# Patient Record
Sex: MALE | Race: WHITE
[De-identification: names, ages, dates, MRNs, and addresses within clinical notes are randomized per-mention and may not be internally consistent; named-entity substitution may affect disease eponyms.]

---

## 2019-09-06 ENCOUNTER — HOSPITAL ENCOUNTER (EMERGENCY)
Dept: HOSPITAL 25 - UCCORT | Age: 54
Discharge: HOME | End: 2019-09-06
Payer: COMMERCIAL

## 2019-09-06 VITALS — SYSTOLIC BLOOD PRESSURE: 148 MMHG | DIASTOLIC BLOOD PRESSURE: 95 MMHG

## 2019-09-06 DIAGNOSIS — I10: ICD-10-CM

## 2019-09-06 DIAGNOSIS — Z87.891: ICD-10-CM

## 2019-09-06 DIAGNOSIS — J32.9: Primary | ICD-10-CM

## 2019-09-06 PROCEDURE — 99202 OFFICE O/P NEW SF 15 MIN: CPT

## 2019-09-06 PROCEDURE — 70450 CT HEAD/BRAIN W/O DYE: CPT

## 2019-09-06 PROCEDURE — G0463 HOSPITAL OUTPT CLINIC VISIT: HCPCS

## 2019-09-06 PROCEDURE — 70486 CT MAXILLOFACIAL W/O DYE: CPT

## 2019-09-06 NOTE — ED
Throat Pain/Nasal Congestion





- HPI Summary


HPI Summary: 





54 yr old male with the complaint of right frontal headache, and right frontal 

sinus pain for 1.5 weeks.  His pain is 8/10 at its worst and at it is better 

after pain meds he took earlier.  He denies fever, chills.  He denies runny 

nose.  He denies post nasal drip.  He denies focal weakness, numbness.  He 

denies change in his speech, vision, hearing, gait.  





The patient reports that he had chest tube in right chest about three weeks ago 

for a pleural effusion that he does not know the etiology of.  He quit smoking 

7 years ago.  He states he had a lot of tests and they were negative down in 

[place].  





- History of Current Complaint


Chief Complaint: UCRespiratory


Time Seen by Provider: 09/06/19 10:37





- Allergies/Home Medications


Allergies/Adverse Reactions: 


 Allergies











Allergy/AdvReac Type Severity Reaction Status Date / Time


 


codeine Allergy  Itching Verified 09/06/19 10:23











Home Medications: 


 Home Medications





Fluticasone/Umeclidin/Vilanter [Trelegy Ellipta 100-62.5-25] 1 puff DAILY 09/06/ 19 [History Confirmed 09/06/19]


Pentazocine HCl/Naloxone HCl [Pentazocine-Naloxone Tablet] 1 tab TID PRN 09/06/ 19 [History Confirmed 09/06/19]


tiZANidine TAB* [Zanaflex TAB*] 2 mg BID PRN 09/06/19 [History Confirmed 09/06/ 19]


traZODone TAB* [Desyrel TAB*] 150 mg PO BEDTIME 09/06/19 [History Confirmed 09/ 06/19]











PMH/Surg Hx/FS Hx/Imm Hx





- Surgical History


Surgery Procedure, Year, and Place: Right lung collapse/pneumothorax.  

Traumatic injuries from fall- hardware in LEFT scapula and ribs


Infectious Disease History: No


Infectious Disease History: 


   Denies: Traveled Outside the [place] in Last 30 Days





- Family History


Known Family History: Positive: None





- Social History


Occupation: Employed Full-time


Alcohol Use: Daily


Substance Use Type: Reports: None


Smoking Status (MU): Never Smoked Tobacco





Review of Systems


Constitutional: Negative


Positive: Other - right frontal sinus pain


All Other Systems Reviewed And Are Negative: Yes





Physical Exam


Triage Information Reviewed: Yes


Vital Signs On Initial Exam: 


 Initial Vitals











Temp Pulse Resp BP Pulse Ox


 


 97.9 F   72   16   148/95   100 


 


 09/06/19 10:26  09/06/19 10:26  09/06/19 10:26  09/06/19 10:26 09/06/19 10:26











Vital Signs Reviewed: Yes


Appearance: Positive: Well-Appearing, No Pain Distress


Skin: Positive: Warm, Skin Color Reflects Adequate Perfusion


Head/Face: Positive: Normal Head/Face Inspection


Eyes: Positive: EOMI, DEVON


ENT: Positive: Normal ENT inspection.  Negative: Sinus tenderness


Neck: Positive: Nontender


Respiratory/Lung Sounds: Positive: Clear to Auscultation, Breath Sounds Present


Cardiovascular: Positive: RRR.  Negative: Murmur


Abdomen Description: Positive: Nontender


Musculoskeletal: Positive: Strength/ROM Intact


Neurological: Positive: Sensory/Motor Intact, Alert, Oriented to Person Place, 

Time, CN Intact II-III, Normal Gait, Speech Normal


Psychiatric: Positive: Normal





- Maximus Coma Scale


Best Eye Response: 4 - Spontaneous


Best Motor Response: 6 - Obeys Commands


Best Verbal Response: 5 - Oriented


Coma Scale Total: 15





Diagnostics





- Vital Signs


 Vital Signs











  Temp Pulse Resp BP Pulse Ox


 


 09/06/19 10:26  97.9 F  72  16  148/95  100














- Laboratory


Lab Statement: Any lab studies that have been ordered have been reviewed, and 

results considered in the medical decision making process.





- Radiology


  ** CT BRAIN AND SINUS


Radiology Interpretation Completed By: Radiologist - sinus mucosal thickening.





EENT Course/Dx





- Course


Course Of Treatment: 54 yr old male with history of smoking and recent right 

chest pleural effusion and he states unknown etiology.  1.5 weeks of frontal 

headache.  CT brain and sinuses ordered.  CT positive for mucosal sinus 

thickening and inflammatory changes.  Will Rx with Augmentin, medrol dose chiki 

and also norco.   He states he tolerates hydrocodone well but not codeine.   

Disks of his CT are being sent with him to take to his doctor in 

[place].[phone] is the istop number





- Diagnoses


Provider Diagnoses: 


 Sinusitis, Hypertension








Discharge ED





- Sign-Out/Discharge


Documenting (check all that apply): Patient Departure


All imaging exams completed and their final reports reviewed: Yes





- Discharge Plan


Condition: Good


Disposition: HOME


Prescriptions: 


Amoxicillin/Clavulanate TAB* [Augmentin *] 875 mg PO BID #20 tab


HYDROcodone/ACETAMIN 5-325 MG* [Norco 5-325 TAB*] 1 tab PO Q6H PRN #4 tab MDD 2


 PRN Reason: Pain - Moderate


methylPREDNISolone [Medrol Dosepak 4 MG*] 4 mg PO .SEE CHIKI INSTRUCTION #1 chiki


Patient Education Materials:  Sinusitis (ED), Hypertension (ED)


Referrals: 


No Primary Care Phys,NOPCP [Primary Care Provider] - 


[facility] PHYSICIAN REFERRAL [Outside]





- Billing Disposition and Condition


Condition: GOOD


Disposition: Home